# Patient Record
Sex: MALE
[De-identification: names, ages, dates, MRNs, and addresses within clinical notes are randomized per-mention and may not be internally consistent; named-entity substitution may affect disease eponyms.]

---

## 2022-12-28 ENCOUNTER — APPOINTMENT (OUTPATIENT)
Dept: UROLOGY | Facility: CLINIC | Age: 32
End: 2022-12-28
Payer: COMMERCIAL

## 2022-12-28 VITALS
DIASTOLIC BLOOD PRESSURE: 81 MMHG | HEART RATE: 75 BPM | SYSTOLIC BLOOD PRESSURE: 121 MMHG | WEIGHT: 180 LBS | OXYGEN SATURATION: 98 % | BODY MASS INDEX: 24.38 KG/M2 | TEMPERATURE: 98.2 F | HEIGHT: 72 IN

## 2022-12-28 PROBLEM — Z00.00 ENCOUNTER FOR PREVENTIVE HEALTH EXAMINATION: Status: ACTIVE | Noted: 2022-12-28

## 2022-12-28 PROCEDURE — 99203 OFFICE O/P NEW LOW 30 MIN: CPT

## 2022-12-28 NOTE — HISTORY OF PRESENT ILLNESS
[FreeTextEntry1] : Name BEATRICE GONZALES \par MRN [id number] \par  Apr  3 1990 \par ------------------------------------------------------------------------------------------------------------------------------------------- \par Date of First Visit: 22\par Referring Provider/PCP: self ([place]) / ********\par ------------------------------------------------------------------------------------------------------------------------------------------- \par CC: fertility counseling\par \par History of Present Illness: BEATRICE GONZALES is a 32 year male who presents for fertility evaluation and counseling.  He and his fiancée (32 years old) have been having unprotected sex for 6 to 12 months though not trying rigorously to get pregnant (no ovulation timing).  Neither he nor his partner have had a pregnancy previously.  Past medical history is largely unremarkable except for some elevated blood sugars which are controlled with diet.  No history of any cancers or radiation.  No medications.  Denies erectile or ejaculatory dysfunction.  Partner is simultaneously undergoing fertility evaluation.\par \par \par

## 2022-12-28 NOTE — ASSESSMENT
[FreeTextEntry1] : Assessment:\par \par BEATRICE OGNZALES is a 32 year male with no PMH who presents for fertility counseling. \par \par \par Plan:\par -Semen analysis\par -Further follow-up and evaluation pending result

## 2022-12-28 NOTE — PHYSICAL EXAM
[General Appearance - Well Developed] : well developed [Normal Appearance] : normal appearance [General Appearance - In No Acute Distress] : no acute distress [] : no respiratory distress [Urethral Meatus] : meatus normal [Penis Abnormality] : normal uncircumcised penis [Scrotum] : the scrotum was normal [Epididymis] : the epididymides were normal [Testes Tenderness] : no tenderness of the testes [Testes Mass (___cm)] : there were no testicular masses [FreeTextEntry1] : Bilateral palpable cords, no varicoceles bilaterally